# Patient Record
Sex: MALE | Race: WHITE | Employment: OTHER | URBAN - METROPOLITAN AREA
[De-identification: names, ages, dates, MRNs, and addresses within clinical notes are randomized per-mention and may not be internally consistent; named-entity substitution may affect disease eponyms.]

---

## 2017-01-18 PROBLEM — S72.002D CLOSED LEFT HIP FRACTURE, WITH ROUTINE HEALING, SUBSEQUENT ENCOUNTER: Status: ACTIVE | Noted: 2017-01-18

## 2017-09-21 PROBLEM — E78.2 MIXED HYPERLIPIDEMIA: Status: ACTIVE | Noted: 2017-09-21

## 2017-09-21 PROBLEM — I10 ESSENTIAL HYPERTENSION: Status: ACTIVE | Noted: 2017-09-21

## 2017-09-21 PROBLEM — Z95.810 ICD (IMPLANTABLE CARDIOVERTER-DEFIBRILLATOR) IN PLACE: Status: ACTIVE | Noted: 2017-09-21

## 2017-12-15 PROBLEM — I50.22 CHRONIC SYSTOLIC CONGESTIVE HEART FAILURE (HCC): Status: ACTIVE | Noted: 2017-12-15

## 2018-12-14 PROBLEM — I10 HTN (HYPERTENSION), BENIGN: Status: ACTIVE | Noted: 2018-12-14

## 2019-03-12 PROBLEM — I50.22 CHF (CONGESTIVE HEART FAILURE), NYHA CLASS I, CHRONIC, SYSTOLIC (HCC): Status: ACTIVE | Noted: 2017-12-15

## 2019-12-10 PROBLEM — I70.0 ATHEROSCLEROSIS OF AORTA (HCC): Status: ACTIVE | Noted: 2019-12-10

## 2023-01-17 PROBLEM — M54.50 CHRONIC LOW BACK PAIN: Status: ACTIVE | Noted: 2021-08-03

## 2023-01-17 PROBLEM — E87.1 HYPONATREMIA: Status: ACTIVE | Noted: 2020-08-28

## 2023-01-17 PROBLEM — I48.91 ATRIAL FIBRILLATION (HCC): Status: ACTIVE | Noted: 2023-01-17

## 2023-01-17 PROBLEM — R26.89 MULTIFACTORIAL GAIT DISORDER: Status: ACTIVE | Noted: 2020-11-02

## 2023-01-17 PROBLEM — R20.2 PARESTHESIA OF BOTH HANDS: Status: ACTIVE | Noted: 2019-05-15

## 2023-01-17 PROBLEM — R20.0 NUMBNESS: Status: ACTIVE | Noted: 2021-01-28

## 2023-01-17 PROBLEM — J44.9 CHRONIC OBSTRUCTIVE PULMONARY DISEASE (HCC): Status: ACTIVE | Noted: 2017-05-12

## 2023-01-17 PROBLEM — G89.29 CHRONIC LOW BACK PAIN: Status: ACTIVE | Noted: 2021-08-03

## 2023-01-18 ENCOUNTER — OFFICE VISIT (OUTPATIENT)
Dept: SURGERY | Facility: CLINIC | Age: 78
End: 2023-01-18
Payer: MEDICARE

## 2023-01-18 VITALS
WEIGHT: 137.81 LBS | OXYGEN SATURATION: 100 % | SYSTOLIC BLOOD PRESSURE: 89 MMHG | BODY MASS INDEX: 20 KG/M2 | TEMPERATURE: 98 F | HEART RATE: 82 BPM | RESPIRATION RATE: 16 BRPM | DIASTOLIC BLOOD PRESSURE: 58 MMHG

## 2023-01-18 DIAGNOSIS — S01.00XD OPEN WOUND OF SCALP, UNSPECIFIED OPEN WOUND TYPE, SUBSEQUENT ENCOUNTER: Primary | ICD-10-CM

## 2023-01-18 DIAGNOSIS — C80.1 MALIGNANT SPINDLE CELL NEOPLASM (HCC): Primary | ICD-10-CM

## 2023-01-18 DIAGNOSIS — C80.1 MALIGNANT SPINDLE CELL NEOPLASM (HCC): ICD-10-CM

## 2023-01-18 DIAGNOSIS — S01.00XA OPEN WOUND OF SCALP, UNSPECIFIED OPEN WOUND TYPE, INITIAL ENCOUNTER: ICD-10-CM

## 2023-01-18 NOTE — PATIENT INSTRUCTIONS
Surgeon:              Dr. Yuko Serrano. Antonette Montanez, PhD                                          Tel:         [phone]                                  Fax:        [phone]    Surgery/Procedure: Scalp reconstruction with possible integra, possible skin graft, possible local tissue rearrangement, 2 hours for Jaz's portion, joint with Salti, general anesthesia, outpatient  AND  Scalp reconstruction with skin graft  1.5 hours, general anesthesia, outpatient, 3 weeks after first procedure                                      Hospital:  [facility]: [address] [place] [address], [place], [address]           [phone]  [facility]: [address], [facility]               [phone]    1. Someone will need to drive you to and from the hospital if your procedure is outpatient. 2.Do not drink alcohol or smoke 24 hours prior to your procedure. 3. Bring a picture ID and your insurance card. 4. You will be contacted by the hospital the day before to confirm the procedure time and location. 5. The hospital will also contact you approximately one week before surgery to schedule your COVID test.     6. Do not take any herbal supplements or blood thinners at least one week before your procedure/surgery. This includes NSAID's (aspirin, baby aspirin, Motrin, Ibuprofen, Aleve, Advil, Naproxen, etc), Plavix, fish oil, vitamin E, turmeric, CoQ10, or green tea supplements, etc. *TYLENOL or acetaminophen is ok to take*    7. PRE-OPERATIVE TESTING: History and physical with medical clearance is REQUIRED within 30 days of the surgery date and is mandatory per Dr. Antonette Montanez. *If this is not done, your surgery will be postponed*  [facility] [facility]   CBC  CMP  EKG  Cardiac clearance with perioperative instructions regarding anticoagulation     8. Please inform us if you develop any Covid-19 like symptoms, test positive or have been exposed for Covid- 19 prior to surgery.      Consent obtained 01/18/2023  Photos taken on 01/18/2023

## 2023-01-20 ENCOUNTER — LAB ENCOUNTER (OUTPATIENT)
Dept: LAB | Facility: HOSPITAL | Age: 78
End: 2023-01-20
Attending: SURGERY
Payer: MEDICARE

## 2023-01-20 DIAGNOSIS — C80.1 MALIGNANT SPINDLE CELL NEOPLASM (HCC): Primary | ICD-10-CM

## 2023-01-20 DIAGNOSIS — C44.42 SQUAMOUS CELL CARCINOMA, SCALP/NECK: Primary | ICD-10-CM

## 2023-01-20 DIAGNOSIS — C80.1 MALIGNANT SPINDLE CELL NEOPLASM (HCC): ICD-10-CM

## 2023-01-20 PROCEDURE — 88321 CONSLTJ&REPRT SLD PREP ELSWR: CPT

## 2023-01-23 ENCOUNTER — APPOINTMENT (OUTPATIENT)
Dept: LAB | Facility: HOSPITAL | Age: 78
End: 2023-01-23
Attending: PATHOLOGY
Payer: MEDICARE

## 2023-01-23 PROCEDURE — 88321 CONSLTJ&REPRT SLD PREP ELSWR: CPT

## 2023-01-30 ENCOUNTER — TELEPHONE (OUTPATIENT)
Dept: SURGERY | Facility: CLINIC | Age: 78
End: 2023-01-30

## 2023-01-30 NOTE — TELEPHONE ENCOUNTER
Calling to discuss surgery scheduling. Spoke with Yan Queen, the patient's daughter, who said they haven't yet discussed whether or not they wish to proceed with the surgery option. Asked that someone from Dr. Blanca North team reaches out to discuss. I assured her I would pass this information on and that someone would reach out accordingly to discuss.

## 2023-01-31 ENCOUNTER — TELEPHONE (OUTPATIENT)
Dept: SURGERY | Facility: CLINIC | Age: 78
End: 2023-01-31

## 2023-01-31 NOTE — TELEPHONE ENCOUNTER
Spoke with patient's daughter, Orlando Bermudez, to give her and patient an update regarding surgery. Informed Orlando Bermudez that it not recommended to do any surgical reconstruction before knowing that margins are clear from any atypical or cancer cells. Orlando Bermudez stated the patient would like this to be done under local anesthesia. Informed her we will reach out after discussing this with the surgeons. Orlando Bermudez was appreciative of the call.

## 2023-02-03 ENCOUNTER — TELEPHONE (OUTPATIENT)
Dept: SURGERY | Facility: CLINIC | Age: 78
End: 2023-02-03

## 2023-02-03 NOTE — TELEPHONE ENCOUNTER
Called and spoke with daughter and let her know that Dr. Дмитрий Llamas spoke with Dr. Hailee Rosa regarding the margins. Let her know that he would need more of an excision. Daughter stated understanding and she stated her father did not want to do general anaesthesia. Stated I would discuss with the surgeons.

## 2023-02-06 ENCOUNTER — TELEPHONE (OUTPATIENT)
Dept: SURGERY | Facility: CLINIC | Age: 78
End: 2023-02-06

## 2023-02-07 ENCOUNTER — TELEPHONE (OUTPATIENT)
Dept: SURGERY | Facility: CLINIC | Age: 78
End: 2023-02-07

## 2023-02-07 DIAGNOSIS — C80.1 MALIGNANT SPINDLE CELL NEOPLASM (HCC): Primary | ICD-10-CM

## 2023-02-07 NOTE — TELEPHONE ENCOUNTER
Calling patient to confirm 03/21/2023 at the [facility] with Dr. Zoey John and Dr. Stephani Engle works with the patient. Yan Queen, the patient's daughter, confirmed and agreed.

## 2023-03-10 ENCOUNTER — ANESTHESIA EVENT (OUTPATIENT)
Dept: SURGERY | Facility: HOSPITAL | Age: 78
End: 2023-03-10
Payer: MEDICARE

## 2023-03-18 ENCOUNTER — LAB ENCOUNTER (OUTPATIENT)
Dept: LAB | Age: 78
End: 2023-03-18
Attending: SURGERY
Payer: MEDICARE

## 2023-03-18 DIAGNOSIS — Z01.818 PRE-OP TESTING: ICD-10-CM

## 2023-03-19 LAB — SARS-COV-2 RNA RESP QL NAA+PROBE: NOT DETECTED

## 2023-03-21 ENCOUNTER — HOSPITAL ENCOUNTER (OUTPATIENT)
Facility: HOSPITAL | Age: 78
Setting detail: HOSPITAL OUTPATIENT SURGERY
Discharge: HOME | End: 2023-03-21
Attending: SURGERY | Admitting: SURGERY
Payer: MEDICARE

## 2023-03-21 ENCOUNTER — ANESTHESIA (OUTPATIENT)
Dept: SURGERY | Facility: HOSPITAL | Age: 78
End: 2023-03-21
Payer: MEDICARE

## 2023-03-21 VITALS
DIASTOLIC BLOOD PRESSURE: 70 MMHG | WEIGHT: 143.19 LBS | OXYGEN SATURATION: 98 % | HEIGHT: 68 IN | HEART RATE: 71 BPM | BODY MASS INDEX: 21.7 KG/M2 | SYSTOLIC BLOOD PRESSURE: 105 MMHG | RESPIRATION RATE: 20 BRPM | TEMPERATURE: 99 F

## 2023-03-21 DIAGNOSIS — C80.1 MALIGNANT SPINDLE CELL NEOPLASM (HCC): ICD-10-CM

## 2023-03-21 DIAGNOSIS — Z01.818 PRE-OP TESTING: Primary | ICD-10-CM

## 2023-03-21 LAB
ANTIBODY SCREEN: NEGATIVE
ATRIAL RATE: 60 BPM
P AXIS: 72 DEGREES
P-R INTERVAL: 182 MS
Q-T INTERVAL: 482 MS
QRS DURATION: 126 MS
QTC CALCULATION (BEZET): 482 MS
R AXIS: 24 DEGREES
RH BLOOD TYPE: POSITIVE
T AXIS: 100 DEGREES
VENTRICULAR RATE: 60 BPM

## 2023-03-21 PROCEDURE — 0JB00ZZ EXCISION OF SCALP SUBCUTANEOUS TISSUE AND FASCIA, OPEN APPROACH: ICD-10-PCS | Performed by: SURGERY

## 2023-03-21 PROCEDURE — 0HR0XK3 REPLACEMENT OF SCALP SKIN WITH NONAUTOLOGOUS TISSUE SUBSTITUTE, FULL THICKNESS, EXTERNAL APPROACH: ICD-10-PCS | Performed by: SURGERY

## 2023-03-21 PROCEDURE — 88331 PATH CONSLTJ SURG 1 BLK 1SPC: CPT | Performed by: SURGERY

## 2023-03-21 PROCEDURE — 0JQ00ZZ REPAIR SCALP SUBCUTANEOUS TISSUE AND FASCIA, OPEN APPROACH: ICD-10-PCS | Performed by: SURGERY

## 2023-03-21 PROCEDURE — 86900 BLOOD TYPING SEROLOGIC ABO: CPT | Performed by: SURGERY

## 2023-03-21 PROCEDURE — 0HB4XZZ EXCISION OF NECK SKIN, EXTERNAL APPROACH: ICD-10-PCS | Performed by: SURGERY

## 2023-03-21 PROCEDURE — 88304 TISSUE EXAM BY PATHOLOGIST: CPT | Performed by: SURGERY

## 2023-03-21 PROCEDURE — 93010 ELECTROCARDIOGRAM REPORT: CPT | Performed by: INTERNAL MEDICINE

## 2023-03-21 PROCEDURE — 86850 RBC ANTIBODY SCREEN: CPT | Performed by: SURGERY

## 2023-03-21 PROCEDURE — 0HR3X73 REPLACEMENT OF LEFT EAR SKIN WITH AUTOLOGOUS TISSUE SUBSTITUTE, FULL THICKNESS, EXTERNAL APPROACH: ICD-10-PCS | Performed by: SURGERY

## 2023-03-21 PROCEDURE — 88311 DECALCIFY TISSUE: CPT | Performed by: SURGERY

## 2023-03-21 PROCEDURE — 88305 TISSUE EXAM BY PATHOLOGIST: CPT | Performed by: SURGERY

## 2023-03-21 PROCEDURE — 93005 ELECTROCARDIOGRAM TRACING: CPT

## 2023-03-21 PROCEDURE — 86901 BLOOD TYPING SEROLOGIC RH(D): CPT | Performed by: SURGERY

## 2023-03-21 PROCEDURE — 0HB3XZZ EXCISION OF LEFT EAR SKIN, EXTERNAL APPROACH: ICD-10-PCS | Performed by: SURGERY

## 2023-03-21 DEVICE — INTEGRA® BILAYER MATRIX WOUND DRESSING 2 IN*2 IN (5 CM*5 CM)
Type: IMPLANTABLE DEVICE | Site: SCALP | Status: FUNCTIONAL
Brand: INTEGRA®

## 2023-03-21 RX ORDER — AMIODARONE HYDROCHLORIDE 200 MG/1
200 TABLET ORAL DAILY
COMMUNITY
Start: 2023-02-02

## 2023-03-21 RX ORDER — SODIUM CHLORIDE, SODIUM LACTATE, POTASSIUM CHLORIDE, CALCIUM CHLORIDE 600; 310; 30; 20 MG/100ML; MG/100ML; MG/100ML; MG/100ML
INJECTION, SOLUTION INTRAVENOUS CONTINUOUS
Status: DISCONTINUED | OUTPATIENT
Start: 2023-03-21 | End: 2023-03-21

## 2023-03-21 RX ORDER — MIDODRINE HYDROCHLORIDE 10 MG/1
10 TABLET ORAL ONCE
Status: COMPLETED | OUTPATIENT
Start: 2023-03-21 | End: 2023-03-21

## 2023-03-21 RX ORDER — TRAMADOL HYDROCHLORIDE 50 MG/1
50 TABLET ORAL EVERY 6 HOURS PRN
Qty: 10 TABLET | Refills: 0 | Status: SHIPPED | OUTPATIENT
Start: 2023-03-21

## 2023-03-21 RX ORDER — DOBUTAMINE HYDROCHLORIDE 200 MG/100ML
INJECTION INTRAVENOUS CONTINUOUS PRN
Status: DISCONTINUED | OUTPATIENT
Start: 2023-03-21 | End: 2023-03-21 | Stop reason: SURG

## 2023-03-21 RX ORDER — ONDANSETRON 2 MG/ML
4 INJECTION INTRAMUSCULAR; INTRAVENOUS EVERY 6 HOURS PRN
Status: DISCONTINUED | OUTPATIENT
Start: 2023-03-21 | End: 2023-03-21

## 2023-03-21 RX ORDER — ROCURONIUM BROMIDE 10 MG/ML
INJECTION, SOLUTION INTRAVENOUS AS NEEDED
Status: DISCONTINUED | OUTPATIENT
Start: 2023-03-21 | End: 2023-03-21 | Stop reason: SURG

## 2023-03-21 RX ORDER — LIDOCAINE HYDROCHLORIDE AND EPINEPHRINE 10; 10 MG/ML; UG/ML
INJECTION, SOLUTION INFILTRATION; PERINEURAL AS NEEDED
Status: DISCONTINUED | OUTPATIENT
Start: 2023-03-21 | End: 2023-03-21 | Stop reason: HOSPADM

## 2023-03-21 RX ORDER — PHENYLEPHRINE HCL 10 MG/ML
VIAL (ML) INJECTION AS NEEDED
Status: DISCONTINUED | OUTPATIENT
Start: 2023-03-21 | End: 2023-03-21 | Stop reason: SURG

## 2023-03-21 RX ORDER — ACETAMINOPHEN 500 MG
1000 TABLET ORAL ONCE
Status: DISCONTINUED | OUTPATIENT
Start: 2023-03-21 | End: 2023-03-21 | Stop reason: HOSPADM

## 2023-03-21 RX ORDER — LIDOCAINE HYDROCHLORIDE 10 MG/ML
INJECTION, SOLUTION EPIDURAL; INFILTRATION; INTRACAUDAL; PERINEURAL AS NEEDED
Status: DISCONTINUED | OUTPATIENT
Start: 2023-03-21 | End: 2023-03-21 | Stop reason: SURG

## 2023-03-21 RX ORDER — EPHEDRINE SULFATE 50 MG/ML
INJECTION INTRAVENOUS AS NEEDED
Status: DISCONTINUED | OUTPATIENT
Start: 2023-03-21 | End: 2023-03-21 | Stop reason: SURG

## 2023-03-21 RX ORDER — CEFAZOLIN SODIUM/WATER 2 G/20 ML
SYRINGE (ML) INTRAVENOUS
Status: DISCONTINUED
Start: 2023-03-21 | End: 2023-03-21

## 2023-03-21 RX ORDER — CEFAZOLIN SODIUM/WATER 2 G/20 ML
2 SYRINGE (ML) INTRAVENOUS ONCE
Status: COMPLETED | OUTPATIENT
Start: 2023-03-21 | End: 2023-03-21

## 2023-03-21 RX ORDER — ACETAMINOPHEN 500 MG
1000 TABLET ORAL ONCE AS NEEDED
Status: DISCONTINUED | OUTPATIENT
Start: 2023-03-21 | End: 2023-03-21

## 2023-03-21 RX ORDER — NALOXONE HYDROCHLORIDE 0.4 MG/ML
80 INJECTION, SOLUTION INTRAMUSCULAR; INTRAVENOUS; SUBCUTANEOUS AS NEEDED
Status: DISCONTINUED | OUTPATIENT
Start: 2023-03-21 | End: 2023-03-21

## 2023-03-21 RX ADMIN — EPHEDRINE SULFATE 10 MG: 50 INJECTION INTRAVENOUS at 09:15:00

## 2023-03-21 RX ADMIN — DOBUTAMINE HYDROCHLORIDE 4 MCG/KG/MIN: 200 INJECTION INTRAVENOUS at 10:10:00

## 2023-03-21 RX ADMIN — CEFAZOLIN SODIUM/WATER 2 G: 2 G/20 ML SYRINGE (ML) INTRAVENOUS at 08:06:00

## 2023-03-21 RX ADMIN — SODIUM CHLORIDE, SODIUM LACTATE, POTASSIUM CHLORIDE, CALCIUM CHLORIDE: 600; 310; 30; 20 INJECTION, SOLUTION INTRAVENOUS at 07:34:00

## 2023-03-21 RX ADMIN — ROCURONIUM BROMIDE 20 MG: 10 INJECTION, SOLUTION INTRAVENOUS at 08:12:00

## 2023-03-21 RX ADMIN — SODIUM CHLORIDE, SODIUM LACTATE, POTASSIUM CHLORIDE, CALCIUM CHLORIDE: 600; 310; 30; 20 INJECTION, SOLUTION INTRAVENOUS at 10:48:00

## 2023-03-21 RX ADMIN — ROCURONIUM BROMIDE 10 MG: 10 INJECTION, SOLUTION INTRAVENOUS at 09:43:00

## 2023-03-21 RX ADMIN — DOBUTAMINE HYDROCHLORIDE 15 MCG/KG/MIN: 200 INJECTION INTRAVENOUS at 09:15:00

## 2023-03-21 RX ADMIN — DOBUTAMINE HYDROCHLORIDE 8 MCG/KG/MIN: 200 INJECTION INTRAVENOUS at 09:51:00

## 2023-03-21 RX ADMIN — DOBUTAMINE HYDROCHLORIDE 3 MCG/KG/MIN: 200 INJECTION INTRAVENOUS at 07:52:00

## 2023-03-21 RX ADMIN — LIDOCAINE HYDROCHLORIDE 20 MG: 10 INJECTION, SOLUTION EPIDURAL; INFILTRATION; INTRACAUDAL; PERINEURAL at 07:45:00

## 2023-03-21 RX ADMIN — PHENYLEPHRINE HCL 200 MCG: 10 MG/ML VIAL (ML) INJECTION at 09:13:00

## 2023-03-21 RX ADMIN — DOBUTAMINE HYDROCHLORIDE 5 MCG/KG/MIN: 200 INJECTION INTRAVENOUS at 08:03:00

## 2023-03-21 RX ADMIN — DOBUTAMINE HYDROCHLORIDE 7 MCG/KG/MIN: 200 INJECTION INTRAVENOUS at 08:12:00

## 2023-03-21 RX ADMIN — PHENYLEPHRINE HCL 100 MCG: 10 MG/ML VIAL (ML) INJECTION at 09:08:00

## 2023-03-21 RX ADMIN — EPHEDRINE SULFATE 10 MG: 50 INJECTION INTRAVENOUS at 07:55:00

## 2023-03-21 RX ADMIN — PHENYLEPHRINE HCL 300 MCG: 10 MG/ML VIAL (ML) INJECTION at 09:26:00

## 2023-03-21 RX ADMIN — ROCURONIUM BROMIDE 35 MG: 10 INJECTION, SOLUTION INTRAVENOUS at 07:45:00

## 2023-03-21 RX ADMIN — DOBUTAMINE HYDROCHLORIDE 12 MCG/KG/MIN: 200 INJECTION INTRAVENOUS at 09:05:00

## 2023-03-21 RX ADMIN — DOBUTAMINE HYDROCHLORIDE 10 MCG/KG/MIN: 200 INJECTION INTRAVENOUS at 09:43:00

## 2023-03-21 RX ADMIN — EPHEDRINE SULFATE 10 MG: 50 INJECTION INTRAVENOUS at 09:26:00

## 2023-03-21 NOTE — DISCHARGE INSTRUCTIONS
Plastic Surgery Home Care Instructions      We hope you were pleased with your care at [facility].  We wish you the best outcome and overall experience with your operation. These instructions will help to minimize pain, optimize healing, and improve the likelihood of a successful result. What To Expect  There may be some spotting of the incision lines for the next few days. Mild bruising, mild swelling and discomfort in the surgical area is typical.   Temporary areas of numbness are typical in the early weeks following your procedure. Normalization of sensation can typically take up to several months following the operation. Bandages (Dressing)  Wear head dressing for at least two days. You can leave it on until your post-operative visit or remove the outer dressing after two days. Apply extra gauze as needed if you have any oozing through the surgical dressing. Leave yellow gauze in place. Apply antibiotic ointment (Neosporin, bacitracin etc) daily around yellow gauze and along the incision on your neck if you remove the head dressing    Bathing/Showers  DO NOT get surgical areas wet  No baths, swimming, or hot tubs until you receive medical permission    Pain Medication: Tramadol  Take one tablet every six hours as needed for pain. Do not take narcotics, if you do not have pain. Keep stools soft. Take a stool softener (Metamucil, Milk of Magnesia, Colace). Eating fruit will also help to prevent constipation    Over-The-Counter Medication  You can take Tylenol after surgery for pain relief as needed  Take as directed on the bottle    Home Medication  Resume your home medications as instructed,  Do not resume herbal medications for two weeks    Diet  Resume your normal diet    Activity  No strenuous activity   You cannot return to physical exercise, sports, or gym workouts until you are allowed to participate in strenuous activity.     Driving  Do not drive, if you are taking pain medication. Return to Work or [place] can return to work when you are not taking pain medication, if your work does not involve strenuous activity. Contact the office, if you need a medical note. Follow-up Appointment with Ashlee Weldon PA-C on 3/27/2023 and with Dr. Akanksha Yang on 4/5/2023  Our office will call you to set up a time    Questions or Concerns  Call Dr. Shirin Cordero office if you experience severe pain not controlled by pain medication, swelling, numbness, tingling, bleeding, fever, or other concerns. If she is not available, another physician will be able to address your concerns. Noam Villalobos   Thank you for coming to [facility] for your operation. The nurses and the anesthesiologist try very hard to make sure you receive the best care possible. Your trust in them is greatly appreciated.     Thanks so much,  Dr. Olya Esquivel PA-C

## 2023-03-21 NOTE — BRIEF OP NOTE
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     Surgeon(s) and Role:     * Antonio Larry MD - Primary    Assistant(s):  PA: ROSARIO Grimaldo     Surgical Findings: as expected, margins negative per frozen     Specimen: yes     Estimated Blood Loss: 10 ml Bel Villavicencio  3/21/2023  9:51 AM

## 2023-03-21 NOTE — ANESTHESIA PROCEDURE NOTES
Airway  Date/Time: 3/21/2023 7:48 AM  Urgency: elective      General Information and Staff    Patient location during procedure: OR  Anesthesiologist: Max Garcia MD  Resident/CRNA: Abdirahman Dotson CRNA  Performed: CRNA   Performed by: Abdirahman Dotson CRNA  Authorized by: Max Garcia MD      Indications and Patient Condition  Indications for airway management: anesthesia  Sedation level: deep  Preoxygenated: yes  Patient position: sniffing  Mask difficulty assessment: 1 - vent by mask    Final Airway Details  Final airway type: endotracheal airway      Successful airway: ETT  Cuffed: yes   Successful intubation technique: direct laryngoscopy  Endotracheal tube insertion site: oral  Blade: Pancho  Blade size: #4  ETT size (mm): 7.0    Cormack-Lehane Classification: grade IIB - view of arytenoids or posterior of glottis only  Placement verified by: chest auscultation and capnometry   Measured from: lips  ETT to lips (cm): 21  Number of attempts at approach: 1

## 2023-03-21 NOTE — BRIEF OP NOTE
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

## 2023-03-21 NOTE — ANESTHESIA POSTPROCEDURE EVALUATION
[facility] [place]el [place] Patient Status:  Hospital Outpatient Surgery   Age/Gender 76year old male MRN [id number]   Location [address] [facility] Attending Karely Eric MD   [facility] Day # 0 PCP Martell Roberson DO       Anesthesia Post-op Note    Radical resection spindle cell neoplasm scalp; Excision Left ear skin lesion (SALTI). Scalp reconstruction with possible integra, possible skin graft, possible local tissue rearrangement (MONTY)    Procedure Summary     Date: 03/21/23 Room / Location: [facility] MAIN OR [address] [place] [address] MAIN OR    Anesthesia Start: 0730 Anesthesia Stop: 6733    Procedures:       Radical resection spindle cell neoplasm scalp; Excision Left ear skin lesion (SALTI). Scalp reconstruction with possible integra, possible skin graft, possible local tissue rearrangement (MONTY)      . Diagnosis:       Malignant spindle cell neoplasm (HCC)      (Malignant spindle cell neoplasm ([facility] [address] [C80.1])    Surgeons: Karely Eric MD; Boris Oliveros MD Anesthesiologist: Juan Mayorga MD    Anesthesia Type: general ASA Status: 3          Anesthesia Type: No value filed. Vitals Value Taken Time   BP 95/67 03/21/23 1056   Temp 97.7 03/21/23 1056   Pulse 97 03/21/23 1056   Resp 20 03/21/23 1056   SpO2 97% 03/21/23 1056       Patient Location: PACU    Anesthesia Type: general    Airway Patency: patent    Postop Pain Control: adequate    Mental Status: mildly sedated but able to meaningfully participate in the post-anesthesia evaluation    Nausea/Vomiting: none    Cardiopulmonary/Hydration status: stable euvolemic    Complications: no apparent anesthesia related complications    Postop vital signs: stable    Dental Exam: Unchanged from Preop    Patient to be discharged from PACU when criteria met.

## 2023-03-22 ENCOUNTER — TELEPHONE (OUTPATIENT)
Dept: SURGERY | Facility: CLINIC | Age: 78
End: 2023-03-22

## 2023-03-22 NOTE — OPERATIVE REPORT
[facility]    PATIENT'S NAME: Vanessa Carolina   ATTENDING PHYSICIAN: Johanna Reynolds. Junito Garza MD   OPERATING PHYSICIAN: Johanna Reynolds. Junito Garza MD   PATIENT ACCOUNT#:   [id number]    LOCATION:  [address]  MEDICAL RECORD #:   [id number]       YOB: 1945  ADMISSION DATE:       03/21/2023      OPERATION DATE:  03/21/2023    OPERATIVE REPORT      PREOPERATIVE DIAGNOSIS:    1.   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 2.   Skin lesion, left ear. POSTOPERATIVE DIAGNOSIS:    1.   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 2.   Skin lesion, left ear. 3.   Pending final pathology report. PROCEDURE:    1.   Radical resection of malignant spindle cell neoplasm of the scalp to include underlying bone. 2.   Excision of skin lesion, left ear (1.5 cm). 3.   Reconstruction with Dr. Carl Bianchi, Plastic Surgery. ASSISTANT:  Florencia Martinez PA-C     ANESTHESIA:  General.    INDICATIONS:  The patient is a 58-year-old man diagnosed with malignant spindle cell neoplasm of the scalp with involved margins. He presents today for surgical management. 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 The surgical treatment matter had been discussed with the patient including indications and risks at length. OPERATIVE TECHNIQUE:  Patient was brought to the operating room and was placed in supine position. He was given general anesthesia by the anesthesiology service. Sequential compression boots were placed. The patient received preoperative intravenous antibiotic prophylaxis. He was prepped and draped in normal sterile fashion. At least 0.5 cm was marked around the lesion of the right scalp, and an incision, circular in nature, measuring 7.5 cm in diameter was made and deepened to include underlying periosteum exposing the bone.   Peripheral margins were taken as extra and sent for frozen section analysis, revealing no evidence for malignancy. The specimen was excised in toto, oriented, and sent to Pathology for permanent section evaluation. I further removed deep margin of the bone using bur and sent the bone shavings to Pathology for permanent section evaluation. Hemostasis was achieved and maintained, and the site was covered with saline-impregnated gauze. Next, a circular incision in the helix of the left ear was made and deepened with peripheral margin sent to Pathology, with deep margins revealing no evidence for malignancy. The skin lesion had been excised and sent to Pathology. At this point, Dr. Maria Mcgee presented for reconstruction, and her portion of the dictation should be noted elsewhere. Patient tolerated my portion of the procedure well without immediate complications. Dictated By Yvan Callahan MD  d: 03/21/2023 16:22:19  t: 03/21/2023 20:57:09  Samm Christina [id number]  ARCHANA/

## 2023-03-22 NOTE — TELEPHONE ENCOUNTER
Called to check on patient after procedure. Spoke with patient daughter and she stated his pain was not relieved with the tramadol. Let her know that he could also take tylenol and alternate. She is going to try that and let me know if it relieves the pain. Confirmed post op appointment.

## 2023-03-22 NOTE — OPERATIVE REPORT
[address]    PATIENT'S NAME: Zoran Kolb   ATTENDING PHYSICIAN: Gisselle Holt. Estephania Khan MD   OPERATING PHYSICIAN: Radha Yang MD   PATIENT ACCOUNT#:   [id number]    LOCATION:  [address] [place] [address]  MEDICAL RECORD #:   [id number]       YOB: 1945  ADMISSION DATE:       03/21/2023      OPERATION DATE:  03/21/2023    OPERATIVE REPORT      PREOPERATIVE DIAGNOSIS:  Spindle cell and squamous cell, scalp; suspicious skin lesion, left ear; open wound, scalp; open wound, left ear. POSTOPERATIVE DIAGNOSIS:  Spindle cell and squamous cell, scalp; suspicious skin lesion, left ear; open wound, scalp; open wound, left ear. PROCEDURE:    1.   Scalp reconstruction with partial complex closure, 2.5 cm; and Integra dermal substitute placement, 25 sq cm. 2.   Left ear reconstruction with partial complex closure, 1 cm; and full-thickness skin graft from left neck, 1 sq cm. ANESTHESIA:  General endotracheal anesthesia. COMPLICATIONS:  None. BLOOD LOSS:  Minimal.    INDICATIONS:  This is a 75-year-old gentleman with a suspicious skin lesion on his left ear and an open wound and spindle cell neoplasm on his scalp. He was seen by Dr. Estephania Khan for surgical excision, and I was consulted for reconstruction. He was seen in the office and discussed reconstructive options including staging of procedure as well as skin graft and partial closure and local tissue rearrangement. Potential risks, complications, benefits and alternatives were discussed. Risks and complications include, but are not limited to, infection, bleeding, scarring, damage to surrounding structures, hematoma, seroma, graft failure, flap failure, need for further surgery. The patient understands and wishes to proceed. Questions were answered. OPERATIVE TECHNIQUE:  Patient was identified in the preoperative area by Dr. Estephania Khan and brought to the operating room.   I was called into the room after Dr. Estephania Khan had completed his portion of the case. At that time, there was an 8 x 6 cm open wound with bleeding calvarial bone on the scalp. There was also an open wound of 1.5 cm on his left helical rim, posterior ear. The frozen sections per the pathologist were all negative except the deep margin on the ear lesion had positive cells, but the extra-deep margin was negative, specifically on the extra-deep surface. Therefore, the reconstruction was started. Proximal complex closure was performed on the scalp to decrease the scalp defect to 6 x 5 cm. This was done with a partial-layer closure with 3-0 interrupted Vicryl sutures and 4-0 chromic sutures, 2.5 cm area. Then, Integra was placed and secured with 4-0 chromic sutures circumferentially. A Xeroform bolster was applied. Then, the ear reconstruction was performed by partial complex closure with 5-0 Vicryl sutures to decrease the defect size, and then a full-thickness skin graft from the left neck was harvested and then placed onto the ear and secured with 5-0 chromic suture circumferentially and centrally. A Xeroform bolster was applied and secured. The donor site was closed with 5-0 interrupted Vicryl sutures and 5-0 Prolene sutures. A head dressing was applied. The patient tolerated the procedure well and awoke from anesthesia without difficulty. All sponge, instrument, and needle counts were correct at the end of the case. Dictated By Hannah Goldmann.  Pedro Luis Dover MD  d: 03/21/2023 17:17:51  t: 03/21/2023 21:35:48  Job [id number]  QOQ/

## 2023-03-27 ENCOUNTER — OFFICE VISIT (OUTPATIENT)
Dept: SURGERY | Facility: CLINIC | Age: 78
End: 2023-03-27
Payer: MEDICARE

## 2023-03-27 DIAGNOSIS — D04.22 SQUAMOUS CELL CARCINOMA IN SITU (SCCIS) OF SKIN OF HELIX OF LEFT EAR: ICD-10-CM

## 2023-03-27 DIAGNOSIS — C80.1 MALIGNANT SPINDLE CELL NEOPLASM (HCC): Primary | ICD-10-CM

## 2023-03-27 PROCEDURE — 99024 POSTOP FOLLOW-UP VISIT: CPT | Performed by: PHYSICIAN ASSISTANT

## 2023-03-27 NOTE — PROGRESS NOTES
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 Guanakito Livingston) and scalp reconstruction with partial complex closure and Integra placement, left ear reconstruction with partial complex closure and full-thickness skin graft from left neck (Dr. Santos Pimentel) on 3/21/2023. He denies fever and chills. He denies nausea, vomiting, diarrhea or constipation. His pain is controlled. Physical Exam     Surgical incisions are clean, dry, and intact. No erythema, no wound drainage. HEENT: Xeroform bolster was removed. Scalp Integra adherent. Left ear skin graft adherent. No wound drainage or surrounding erythema. Left neck incision clean, dry and intact with Prolene sutures in place. No wound drainage, wound dehiscence or erythema. There were no vitals filed for this visit. 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 Guanakito Livingston) and scalp reconstruction with partial complex closure and Integra placement, left ear reconstruction with partial complex closure and full-thickness skin graft from left neck (Dr. Santos Pimentel) on 3/21/2023. Xeroform bolster was removed today and Prolene sutures were removed. A dressing of Neosporin, Xeroform, Telfa, Kerlix head wrap were placed. He will change this daily. He should continue to not get the surgical sites wet. He will follow-up in 1 week for recheck and preoperative visit for his second stage reconstruction which is currently scheduled on 4/11/2023. He was instructed to call with any questions or concerns in the meantime. Questions were answered. Patient understands.      Bel Patel  3/27/2023  12:19 PM

## 2023-03-31 ENCOUNTER — ANESTHESIA EVENT (OUTPATIENT)
Dept: SURGERY | Facility: HOSPITAL | Age: 78
End: 2023-03-31
Payer: MEDICARE

## 2023-04-03 ENCOUNTER — TELEPHONE (OUTPATIENT)
Dept: SURGERY | Facility: CLINIC | Age: 78
End: 2023-04-03

## 2023-04-03 NOTE — TELEPHONE ENCOUNTER
Spoke with patient's daughter, Jessee Da Silva. Jessee Rekha stated the patient has hives throughout his body and is very itchy. She denies that patient has any shortness of breath or trouble breathing. Jessee Da Silva will reach out to patient's PCP for recommendations and further treatment. Informed Dr. Akanksha Yang of patient issue and per Dr. Akanksha Yang, it is okay for patient to start on a steroid if needed, to be ordered by PCP. Jessee Da Silva will let our office know if patient does start steroid medication on Wednesday at patient's visit.

## 2023-04-05 ENCOUNTER — OFFICE VISIT (OUTPATIENT)
Dept: SURGERY | Facility: CLINIC | Age: 78
End: 2023-04-05
Payer: MEDICARE

## 2023-04-05 DIAGNOSIS — C80.1 MALIGNANT SPINDLE CELL NEOPLASM (HCC): Primary | ICD-10-CM

## 2023-04-05 PROCEDURE — 99024 POSTOP FOLLOW-UP VISIT: CPT | Performed by: PHYSICIAN ASSISTANT

## 2023-04-05 PROCEDURE — 99024 POSTOP FOLLOW-UP VISIT: CPT | Performed by: SURGERY

## 2023-04-06 ENCOUNTER — TELEPHONE (OUTPATIENT)
Dept: SURGERY | Facility: CLINIC | Age: 78
End: 2023-04-06

## 2023-04-06 DIAGNOSIS — C80.1 MALIGNANT SPINDLE CELL NEOPLASM (HCC): Primary | ICD-10-CM

## 2023-04-06 NOTE — TELEPHONE ENCOUNTER
Calling pt daughter in regards to scheduling surgery. Informed pt that I have 04/25/2023 available at [facility] with Dr. Akanksha Yang. Pt verbalized understanding and in agreement with date and location. All questions answered. Encouraged pt to call or [facility] message office with any other questions or concerns.

## 2023-04-11 ENCOUNTER — ANESTHESIA (OUTPATIENT)
Dept: SURGERY | Facility: HOSPITAL | Age: 78
End: 2023-04-11
Payer: MEDICARE

## 2023-04-24 ENCOUNTER — TELEPHONE (OUTPATIENT)
Dept: SURGERY | Facility: CLINIC | Age: 78
End: 2023-04-24

## 2023-04-24 NOTE — TELEPHONE ENCOUNTER
Returning daughter's call wanting to know what time is his surgery, informed her admissions will call them today to let her know.

## 2023-04-25 ENCOUNTER — HOSPITAL ENCOUNTER (OUTPATIENT)
Facility: HOSPITAL | Age: 78
Setting detail: HOSPITAL OUTPATIENT SURGERY
Discharge: HOME | End: 2023-04-25
Attending: SURGERY | Admitting: SURGERY
Payer: MEDICARE

## 2023-04-25 VITALS
WEIGHT: 139 LBS | OXYGEN SATURATION: 96 % | HEIGHT: 68 IN | HEART RATE: 72 BPM | TEMPERATURE: 98 F | SYSTOLIC BLOOD PRESSURE: 95 MMHG | DIASTOLIC BLOOD PRESSURE: 61 MMHG | BODY MASS INDEX: 21.07 KG/M2 | RESPIRATION RATE: 20 BRPM

## 2023-04-25 DIAGNOSIS — Z01.818 PRE-OP TESTING: Primary | ICD-10-CM

## 2023-04-25 PROCEDURE — 0HR0X73 REPLACEMENT OF SCALP SKIN WITH AUTOLOGOUS TISSUE SUBSTITUTE, FULL THICKNESS, EXTERNAL APPROACH: ICD-10-PCS | Performed by: SURGERY

## 2023-04-25 RX ORDER — LIDOCAINE HYDROCHLORIDE AND EPINEPHRINE 10; 10 MG/ML; UG/ML
INJECTION, SOLUTION INFILTRATION; PERINEURAL AS NEEDED
Status: DISCONTINUED | OUTPATIENT
Start: 2023-04-25 | End: 2023-04-25 | Stop reason: HOSPADM

## 2023-04-25 RX ORDER — HYDROCODONE BITARTRATE AND ACETAMINOPHEN 5; 325 MG/1; MG/1
2 TABLET ORAL ONCE AS NEEDED
Status: COMPLETED | OUTPATIENT
Start: 2023-04-25 | End: 2023-04-25

## 2023-04-25 RX ORDER — EPHEDRINE SULFATE 50 MG/ML
INJECTION INTRAVENOUS AS NEEDED
Status: DISCONTINUED | OUTPATIENT
Start: 2023-04-25 | End: 2023-04-25 | Stop reason: SURG

## 2023-04-25 RX ORDER — SODIUM CHLORIDE, SODIUM LACTATE, POTASSIUM CHLORIDE, CALCIUM CHLORIDE 600; 310; 30; 20 MG/100ML; MG/100ML; MG/100ML; MG/100ML
INJECTION, SOLUTION INTRAVENOUS CONTINUOUS
Status: DISCONTINUED | OUTPATIENT
Start: 2023-04-25 | End: 2023-04-25

## 2023-04-25 RX ORDER — HYDROCODONE BITARTRATE AND ACETAMINOPHEN 5; 325 MG/1; MG/1
1 TABLET ORAL ONCE AS NEEDED
Status: COMPLETED | OUTPATIENT
Start: 2023-04-25 | End: 2023-04-25

## 2023-04-25 RX ORDER — PHENYLEPHRINE HCL 10 MG/ML
VIAL (ML) INJECTION AS NEEDED
Status: DISCONTINUED | OUTPATIENT
Start: 2023-04-25 | End: 2023-04-25 | Stop reason: SURG

## 2023-04-25 RX ORDER — DEXAMETHASONE SODIUM PHOSPHATE 4 MG/ML
VIAL (ML) INJECTION AS NEEDED
Status: DISCONTINUED | OUTPATIENT
Start: 2023-04-25 | End: 2023-04-25 | Stop reason: SURG

## 2023-04-25 RX ORDER — ACETAMINOPHEN 500 MG
1000 TABLET ORAL ONCE
Status: DISCONTINUED | OUTPATIENT
Start: 2023-04-25 | End: 2023-04-25 | Stop reason: HOSPADM

## 2023-04-25 RX ORDER — LIDOCAINE HYDROCHLORIDE 10 MG/ML
INJECTION, SOLUTION EPIDURAL; INFILTRATION; INTRACAUDAL; PERINEURAL AS NEEDED
Status: DISCONTINUED | OUTPATIENT
Start: 2023-04-25 | End: 2023-04-25 | Stop reason: SURG

## 2023-04-25 RX ORDER — NALOXONE HYDROCHLORIDE 0.4 MG/ML
80 INJECTION, SOLUTION INTRAMUSCULAR; INTRAVENOUS; SUBCUTANEOUS AS NEEDED
Status: DISCONTINUED | OUTPATIENT
Start: 2023-04-25 | End: 2023-04-25

## 2023-04-25 RX ORDER — HYDROMORPHONE HYDROCHLORIDE 1 MG/ML
0.2 INJECTION, SOLUTION INTRAMUSCULAR; INTRAVENOUS; SUBCUTANEOUS EVERY 5 MIN PRN
Status: DISCONTINUED | OUTPATIENT
Start: 2023-04-25 | End: 2023-04-25

## 2023-04-25 RX ORDER — ONDANSETRON 2 MG/ML
4 INJECTION INTRAMUSCULAR; INTRAVENOUS EVERY 6 HOURS PRN
Status: DISCONTINUED | OUTPATIENT
Start: 2023-04-25 | End: 2023-04-25

## 2023-04-25 RX ORDER — CEFAZOLIN SODIUM/WATER 2 G/20 ML
2 SYRINGE (ML) INTRAVENOUS ONCE
Status: COMPLETED | OUTPATIENT
Start: 2023-04-25 | End: 2023-04-25

## 2023-04-25 RX ORDER — HYDROMORPHONE HYDROCHLORIDE 1 MG/ML
0.4 INJECTION, SOLUTION INTRAMUSCULAR; INTRAVENOUS; SUBCUTANEOUS EVERY 5 MIN PRN
Status: DISCONTINUED | OUTPATIENT
Start: 2023-04-25 | End: 2023-04-25

## 2023-04-25 RX ORDER — METOCLOPRAMIDE HYDROCHLORIDE 5 MG/ML
10 INJECTION INTRAMUSCULAR; INTRAVENOUS EVERY 8 HOURS PRN
Status: DISCONTINUED | OUTPATIENT
Start: 2023-04-25 | End: 2023-04-25

## 2023-04-25 RX ORDER — ACETAMINOPHEN 500 MG
1000 TABLET ORAL ONCE AS NEEDED
Status: COMPLETED | OUTPATIENT
Start: 2023-04-25 | End: 2023-04-25

## 2023-04-25 RX ORDER — HYDROMORPHONE HYDROCHLORIDE 1 MG/ML
0.6 INJECTION, SOLUTION INTRAMUSCULAR; INTRAVENOUS; SUBCUTANEOUS EVERY 5 MIN PRN
Status: DISCONTINUED | OUTPATIENT
Start: 2023-04-25 | End: 2023-04-25

## 2023-04-25 RX ORDER — ONDANSETRON 2 MG/ML
INJECTION INTRAMUSCULAR; INTRAVENOUS AS NEEDED
Status: DISCONTINUED | OUTPATIENT
Start: 2023-04-25 | End: 2023-04-25 | Stop reason: SURG

## 2023-04-25 RX ADMIN — EPHEDRINE SULFATE 10 MG: 50 INJECTION INTRAVENOUS at 10:00:00

## 2023-04-25 RX ADMIN — PHENYLEPHRINE HCL 100 MCG: 10 MG/ML VIAL (ML) INJECTION at 10:26:00

## 2023-04-25 RX ADMIN — PHENYLEPHRINE HCL 100 MCG: 10 MG/ML VIAL (ML) INJECTION at 10:14:00

## 2023-04-25 RX ADMIN — SODIUM CHLORIDE, SODIUM LACTATE, POTASSIUM CHLORIDE, CALCIUM CHLORIDE: 600; 310; 30; 20 INJECTION, SOLUTION INTRAVENOUS at 11:04:00

## 2023-04-25 RX ADMIN — ONDANSETRON 4 MG: 2 INJECTION INTRAMUSCULAR; INTRAVENOUS at 10:55:00

## 2023-04-25 RX ADMIN — PHENYLEPHRINE HCL 100 MCG: 10 MG/ML VIAL (ML) INJECTION at 10:09:00

## 2023-04-25 RX ADMIN — SODIUM CHLORIDE, SODIUM LACTATE, POTASSIUM CHLORIDE, CALCIUM CHLORIDE: 600; 310; 30; 20 INJECTION, SOLUTION INTRAVENOUS at 09:50:00

## 2023-04-25 RX ADMIN — PHENYLEPHRINE HCL 100 MCG: 10 MG/ML VIAL (ML) INJECTION at 10:03:00

## 2023-04-25 RX ADMIN — CEFAZOLIN SODIUM/WATER 2 G: 2 G/20 ML SYRINGE (ML) INTRAVENOUS at 10:00:00

## 2023-04-25 RX ADMIN — DEXAMETHASONE SODIUM PHOSPHATE 8 MG: 4 MG/ML VIAL (ML) INJECTION at 10:02:00

## 2023-04-25 RX ADMIN — EPHEDRINE SULFATE 10 MG: 50 INJECTION INTRAVENOUS at 10:20:00

## 2023-04-25 RX ADMIN — DEXAMETHASONE SODIUM PHOSPHATE 8 MG: 4 MG/ML VIAL (ML) INJECTION at 10:03:00

## 2023-04-25 RX ADMIN — EPHEDRINE SULFATE 5 MG: 50 INJECTION INTRAVENOUS at 10:12:00

## 2023-04-25 RX ADMIN — PHENYLEPHRINE HCL 100 MCG: 10 MG/ML VIAL (ML) INJECTION at 10:18:00

## 2023-04-25 RX ADMIN — EPHEDRINE SULFATE 10 MG: 50 INJECTION INTRAVENOUS at 10:32:00

## 2023-04-25 RX ADMIN — LIDOCAINE HYDROCHLORIDE 50 MG: 10 INJECTION, SOLUTION EPIDURAL; INFILTRATION; INTRACAUDAL; PERINEURAL at 09:54:00

## 2023-04-25 NOTE — ANESTHESIA PROCEDURE NOTES
Airway  Date/Time: 4/25/2023 9:55 AM  Urgency: elective    Airway not difficult    General Information and Staff    Patient location during procedure: OR  Anesthesiologist: Barry Cole MD  Resident/CRNA: Alka Ellis CRNA  Performed: CRNA   Performed by: Alka Ellis CRNA  Authorized by: Barry Cole MD      Indications and Patient Condition  Indications for airway management: anesthesia  Sedation level: deep  Preoxygenated: yes  Patient position: sniffing  Mask difficulty assessment: 1 - vent by mask    Final Airway Details  Final airway type: supraglottic airway      Successful airway: classic  Size 4       Number of attempts at approach: 1

## 2023-04-25 NOTE — DISCHARGE INSTRUCTIONS
Plastic Surgery Home Care Instructions      We hope you were pleased with your care at [facility].  We wish you the best outcome and overall experience with your operation. These instructions will help to minimize pain, optimize healing, and improve the likelihood of a successful result. What To Expect  There may be some spotting of the incision lines for the next few days. Mild bruising, mild swelling and discomfort in the surgical area is typical.   Temporary areas of numbness are typical in the early weeks following your procedure. Normalization of sensation can typically take up to several months following the operation. Bandages (Dressing)  Leave yellow gauze in place. Apply antibiotic ointment (Neosporin, bacitracin etc) daily around yellow gauze  Leave dressing on groin in place for 2 days. After 2 days, remove tape and nonstick gauze. Apply antibiotic ointment daily to incision followed by nonstick gauze and paper tape. Bathing/Showers  DO NOT get scalp wet. You can shower from the neck down starting 2 days after surgery. Apply a new dressing of antibiotic ointment, nonstick gauze and paper tape to the groin incision after shower. No baths, swimming, or hot tubs until you receive medical permission    Over-The-Counter Medication  You can take Tylenol after surgery for pain relief as needed  Take as directed on the bottle    Home Medication  Resume your home medications as instructed  Do not resume herbal medications for two weeks  You can restart your blood thinners at your next dose    Diet  Resume your normal diet    Activity  No strenuous activity   You cannot return to physical exercise, sports, or gym workouts until you are allowed to participate in strenuous activity. Driving  Do not drive, if you are taking pain medication. Return to Work or [place] can return to work when you are not taking pain medication, if your work does not involve strenuous activity.   Contact the office, if you need a medical note. Follow-up Appointment with Brigitte Lizarraga PA-C on 5/1/2023 and with Dr. Leigh Ann Koenig on 5/10/2023  Our office will call you to set up a time    Questions or Concerns  Call Dr. Marshall Mcmanus office if you experience severe pain not controlled by pain medication, swelling, numbness, tingling, bleeding, fever, or other concerns. If she is not available, another physician will be able to address your concerns. Kelsey Oliveros   Thank you for coming to [facility] for your operation. The nurses and the anesthesiologist try very hard to make sure you receive the best care possible. Your trust in them is greatly appreciated.     Thanks so much,  Dr. Joby Buchanan PA-C        Carty Shoulder was Given to you at: 12:30pm  This medication contains Tylenol (acetaminophen)  Do not take additional Tylenol while taking Beula Joshua is a Narcotic and can be constipating or upset your stomach  Don't take Norco on an empty stomach  Drink plenty of water  Alcoholic beverages should be avoided while taking narcotics

## 2023-04-26 NOTE — OPERATIVE REPORT
[address]    PATIENT'S NAME: Jenaro WALLACE   ATTENDING PHYSICIAN: Radha Dover MD   OPERATING PHYSICIAN: Radha Dover MD   PATIENT ACCOUNT#:   [id number]    LOCATION:  [address] [place] [address]  MEDICAL RECORD #:   [id number]       YOB: 1945  ADMISSION DATE:       04/25/2023      OPERATION DATE:  04/25/2023    OPERATIVE REPORT      PREOPERATIVE DIAGNOSIS:  Open wound, scalp. POSTOPERATIVE DIAGNOSIS:  Open wound, scalp. PROCEDURE:  Tangential excision of wound bed, preparation of wound bed, scalp 30 sq cm; full-thickness graft from left groin upper thigh to scalp 30 sq cm. ANESTHESIA:  General endotracheal anesthesia. COMPLICATIONS:  None. BLOOD LOSS:  Minimal.    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 Stefania Wei. At that time, partial wound closure as well as Integra placement were performed. He now presents for skin grafting of the defect. He was seen in the office preoperatively and the plan was discussed. Potential risks, complications, benefits and alternatives were discussed. Risks and complications including, but not limited to, infection, bleeding, scarring, damage to surrounding structures, hematoma, seroma, graft failure, alopecia, need for further surgery. The patient understands and wishes to proceed. Questions were answered. OPERATIVE TECHNIQUE:  Patient was identified in the preoperative area. Informed consent was obtained. The site was marked. The patient was then brought back to the operating room, placed in supine position. He was adequately padded. Sequential compression devices were applied. General endotracheal anesthesia was administered. Perioperative antibiotics were given. Then, his left groin and thigh area were prepped and draped in usual sterile fashion as well as his entire scalp.   Then, the procedure was started with tangential excision of the wound bed on the scalp.  Healthy granulation tissue was seen at the wound bed. After adequate preparation of bed with tangential excision, the area for the graft was measured 5 x 6 cm and the area was harvested from the lower groin, upper thigh area. Then, 1% lidocaine with epinephrine was injected around the donor site. Then, a 10 blade was used to harvest the graft and then the graft was defatted and small drainage holes were placed. Graft was then placed to the scalp and secured with 4-0 chronic sutures circumferentially and in the wound base. Then, a Xeroform bolster was applied to the graft and secured with 2-0 silk sutures. The donor site of the skin graft was closed with 3-0 Vicryl sutures for the deep dermal layer, followed by 4-0 Prolene running sutures. Bacitracin and Telfa were applied to that area. The patient tolerated the procedure well and awoke from anesthesia without difficulty. All sponge, instrument, and needle counts were correct at the end of the case. Dictated By Mariluz Dc.  Klaus Childs MD  d: 04/25/2023 18:03:29  t: 04/26/2023 03:43:25  Job [id number]  \[facility]\""/

## 2023-05-01 ENCOUNTER — OFFICE VISIT (OUTPATIENT)
Dept: SURGERY | Facility: CLINIC | Age: 78
End: 2023-05-01
Payer: MEDICARE

## 2023-05-01 DIAGNOSIS — C80.1 MALIGNANT SPINDLE CELL NEOPLASM (HCC): Primary | ICD-10-CM

## 2023-05-10 ENCOUNTER — OFFICE VISIT (OUTPATIENT)
Dept: SURGERY | Facility: CLINIC | Age: 78
End: 2023-05-10
Payer: MEDICARE

## 2023-05-10 DIAGNOSIS — C80.1 MALIGNANT SPINDLE CELL NEOPLASM (HCC): Primary | ICD-10-CM

## 2023-05-10 PROCEDURE — 99024 POSTOP FOLLOW-UP VISIT: CPT | Performed by: SURGERY

## 2023-05-10 PROCEDURE — 1160F RVW MEDS BY RX/DR IN RCRD: CPT | Performed by: SURGERY

## 2023-05-10 PROCEDURE — 1159F MED LIST DOCD IN RCRD: CPT | Performed by: SURGERY

## 2023-08-16 NOTE — BRIEF OP NOTE
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 HPI    Recently smoking more than the usual and last few days he has more cough and wheezes but no fever or chills  Dyspnea upon exertion with acceptable baseline  No chest pain orthopnea PND  No abdominal pain or nausea or vomiting or diarrhea  No lower extremity edema or pain or calf tenderness  History/Other:   Review of Systems   Constitutional:  Negative for fever and unexpected weight change. HENT:  Negative for congestion. Respiratory:  Positive for cough and wheezing. Cardiovascular: Negative. Genitourinary: Negative. Musculoskeletal: Negative. Skin: Negative. Neurological: Negative. Hematological: Negative. Psychiatric/Behavioral: Negative. Current Outpatient Medications   Medication Sig Dispense Refill    albuterol (VENTOLIN HFA) 108 (90 Base) MCG/ACT Inhalation Aero Soln Inhale 2 puffs into the lungs every 6 (six) hours as needed for Wheezing. 54 each 5    Olmesartan Medoxomil 40 MG Oral Tab Take 1 tablet (40 mg total) by mouth daily. For blood pressure 90 tablet 1    buPROPion  MG Oral Tablet 12 Hr Take 1 tablet (150 mg total) by mouth 2 (two) times daily. For 7 days just take it in the morning and then after that he will take the second dose around 3 PM daily and do twice daily. (Patient taking differently: Take 1 tablet (150 mg total) by mouth 2 (two) times daily. For 7 days just take it in the morning and then after that he will take the second dose around 3 PM daily and do twice daily. ) 180 tablet 0    fluticasone-salmeterol (Gilmar Freddy) 250-50 MCG/ACT Inhalation Aerosol Powder, Breath Activated Inhale 1 puff into the lungs every 12 (twelve) hours. 3 each 3    atorvastatin 10 MG Oral Tab Take 1 tablet (10 mg total) by mouth daily. 90 tablet 2    Fenofibrate 160 MG Oral Tab Take 1 tablet (160 mg total) by mouth daily.  90 tablet 2    levothyroxine 75 MCG Oral Tab Take 1 tablet (75 mcg total) by mouth before breakfast. Due for follow up 90 tablet 2    Omeprazole 40 MG Oral Capsule Delayed Release Take 1 capsule (40 mg total) by mouth daily. 90 capsule 2    fluticasone propionate 50 MCG/ACT Nasal Suspension 2 sprays by Nasal route daily. 48 mL 2    Multiple Vitamins-Minerals (CENTRUM SILVER 50+MEN OR) Take by mouth. Dutasteride 0.5 MG Oral Cap        Allergies:No Known Allergies    Objective:   Physical Exam  Constitutional:       General: He is not in acute distress. Appearance: He is ill-appearing. HENT:      Head: Normocephalic and atraumatic. Nose: Nose normal.      Mouth/Throat:      Mouth: Mucous membranes are moist.   Cardiovascular:      Rate and Rhythm: Normal rate. Heart sounds: No murmur heard. No gallop. Pulmonary:      Effort: Pulmonary effort is normal.      Comments: Diminished breath sounds bilaterally  Few expiratory wheezes  No rales or rhonchi  Abdominal:      General: Abdomen is flat. Bowel sounds are normal.      Tenderness: There is no guarding or rebound. Musculoskeletal:      Cervical back: No rigidity. Lymphadenopathy:      Cervical: No cervical adenopathy. Skin:     Findings: No lesion or rash. Neurological:      General: No focal deficit present. Mental Status: He is oriented to person, place, and time. Mental status is at baseline. Chest ct 6/13/2023 reviewed   FINDINGS:  CARDIAC: Heart is not enlarged. There are coronary artery calcifications. No pericardial effusion. MEDIASTINUM/MEG: No mediastinal or hilar adenopathy. LUNGS/PLEURA: Central airways are patent with probable retained mucus secretions within the trachea bilateral perihilar bronchial wall thickening. Upper lobe predominant panlobular emphysema. Scattered paraseptal thickening. 12 x 13 mm nodular solid  focus in the right upper lobe posterior segment (series 3, image 27). Hairline spiculations along its outer margins.   It previously measured 13 x 17 mm 2/8/23, was not FDG avid on 2/17/23 PET-CT, and was 12 x 15 mm on 5/22/23 CT chest.  No new pulmonary   nodules  VASCULATURE: Main pulmonary artery is not enlarged. Left-sided aortic arch with atherosclerosis without aneurysm. CHEST WALL: No axillary or clavicular lymphadenopathy. LIMITED ABDOMEN: Stable 7 mm low-density focus in the right hepatic lobe, most with cyst however it is too small to definitively characterize. BONES: Scattered mild degenerative change spine. Impression   CONCLUSION:  1.  12 x 13 mm solid pulmonary nodule in the right upper lobe, not FDG avid on 2/17/23 PET-CT and slightly smaller since 2/8/23 CT chest.  Current and prior imaging findings collectively favor a benign process possibly representing secretions within an  airway, infection, or resolving pneumonitis - however continued CT surveillance is recommended. Recommend next follow-up CT chest (without IV contrast) in 3 months. 2.  Coronary atherosclerosis. 3.  Pulmonary emphysema. 4.  Stable subcentimeter low-density focus in the right hepatic lobe likely representing a cyst however it is too small to definitively characterize. Assessment & Plan:   Lung nodule  (primary encounter diagnosis)  Chronic obstructive pulmonary disease, unspecified COPD type ([address]      1- lung nodule      Seem on chest ct 2/8/2023  ( 1.7 x 1.4 cm RUL )     PET scan 2/17/2023 negative       smaller onn last chest ct 6/14/2023 around 12 mm      The decrease in size of the nodule and  previously negative PET scan more suggestive for benign nodule .     F/u chest ct in 4- 6 months interval         2-severe COPD with acute exacerbation   lifelong history of smoking / continue to smoke   FEV1 44 % 1.19 L     Augmentin and taper course of prednisone  Complete smoking cessation and extensive counseling provided  Continue with ICS/LABA with Wixela  Albuterol as needed        F/u in 3  months                   Meds This Visit:  Requested Prescriptions      No prescriptions requested or ordered in this encounter       Imaging & Referrals:  None

## (undated) DEVICE — LIGHT HANDLE

## (undated) DEVICE — SOL NACL IRRIG 0.9% 1000ML BTL

## (undated) DEVICE — SLEEVE KENDALL SCD EXPRESS MED

## (undated) DEVICE — #15 STERILE STAINLESS BLADE: Brand: STERILE STAINLESS BLADES

## (undated) DEVICE — ELECTRODE ESURG 2.75IN EZ CLN

## (undated) DEVICE — SUT SILK 2-0 SH K833H

## (undated) DEVICE — GEL AQUASONIC 100 20GR

## (undated) DEVICE — OCCLUSIVE GAUZE STRIP OVERWRAP,3% BISMUTH TRIBROMOPHENATE IN PETROLATUM BLEND: Brand: XEROFORM

## (undated) DEVICE — SUT PROLENE 4-0 RB-1 8557H

## (undated) DEVICE — NON-ADHERENT PAD PREPACK: Brand: TELFA

## (undated) DEVICE — SUT PROLENE 5-0 RB-2 8710H

## (undated) DEVICE — SUT PROLENE 5-0 P-3 8698G

## (undated) DEVICE — MEDI-VAC SUCTION HANDLE REGULAR CAPACITY: Brand: CARDINAL HEALTH

## (undated) DEVICE — CABLE BIPOLAR 12FT DISPOSABLE

## (undated) DEVICE — SUT VICRYL 5-0 RB-1 J213H

## (undated) DEVICE — HEAD AND NECK CDS-LF: Brand: MEDLINE INDUSTRIES, INC.

## (undated) DEVICE — DRESSING TUBE GAUZE SZ 10

## (undated) DEVICE — STERILE SYNTHETIC POLYISOPRENE POWDER-FREE SURGICAL GLOVES WITH HYDROGEL COATING, SMOOTH FINISH, STRAIGHT FINGER: Brand: PROTEXIS

## (undated) DEVICE — SUT VICRYL 3-0 SH J416H

## (undated) DEVICE — MEGADYNE ELECTRODE ADULT PT RT

## (undated) DEVICE — MEGADYNE E-Z CLEAN BLADE 2.75"

## (undated) DEVICE — PREP BETADINE SOL 5% EYE

## (undated) DEVICE — SUT SILK 3-0 SH K832H

## (undated) DEVICE — SUT PROLENE 2-0 SH 8833H

## (undated) DEVICE — HEMOCLIP HORIZON MED 002200

## (undated) DEVICE — 4.8MM ROUND FAST CUTTING BUR

## (undated) DEVICE — PROXIMATE SKIN STAPLERS (35 WIDE) CONTAINS 35 STAINLESS STEEL STAPLES (FIXED HEAD): Brand: PROXIMATE

## (undated) DEVICE — SUT CHROMIC GUT 5-0 RB-1 U202H

## (undated) DEVICE — 3M™ STERI-STRIP™ REINFORCED ADHESIVE SKIN CLOSURES, R1547, 1/2 IN X 4 IN (12 MM X 100 MM), 6 STRIPS/ENVELOPE: Brand: 3M™ STERI-STRIP™

## (undated) DEVICE — STERILE POLYISOPRENE POWDER-FREE SURGICAL GLOVES: Brand: PROTEXIS

## (undated) DEVICE — SYRINGE 10ML LL TIP

## (undated) DEVICE — TOWEL SURG OR 17X30IN BLUE

## (undated) DEVICE — ELECTRODE EDGE PENCIL 10FT

## (undated) DEVICE — #10 STERILE DISPOSABLE SCALPEL: Brand: DISPOSABLE SCALPEL

## (undated) DEVICE — SPECIMAN CONTAINER 4OZ STERILE

## (undated) DEVICE — STANDARD HYPODERMIC NEEDLE,POLYPROPYLENE HUB: Brand: MONOJECT

## (undated) DEVICE — Device

## (undated) DEVICE — PROXIMATE RH ROTATING HEAD SKIN STAPLERS (35 WIDE) CONTAINS 35 STAINLESS STEEL STAPLES: Brand: PROXIMATE

## (undated) DEVICE — PEN SKIN MARKING REG TIP VIOLT

## (undated) DEVICE — LAPAROTOMY SPONGE - RF AND X-RAY DETECTABLE PRE-WASHED: Brand: SITUATE

## (undated) DEVICE — SHEET, DRAPE, SPLIT, STERILE: Brand: MEDLINE

## (undated) DEVICE — PROVE COVER: Brand: UNBRANDED

## (undated) DEVICE — PREMIUM WET SKIN PREP TRAY: Brand: MEDLINE INDUSTRIES, INC.

## (undated) DEVICE — SUT CHROMIC GUT 4-0 RB-1 U203H

## (undated) DEVICE — BLADE 24 SS SRG STRL

## (undated) DEVICE — SUT ETHIBOND 2-0 SH X833H

## (undated) DEVICE — CURAD NONADHERANT PAD 3X8

## (undated) DEVICE — SHEET,DRAPE,40X58,STERILE: Brand: MEDLINE

## (undated) DEVICE — #10 STERILE BLADE: Brand: POLYMER COATED BLADES

## (undated) NOTE — LETTER
[facility] Cardiac Device Communication Tool    Preop to complete    Patient Name Nico Moscoso   Patient  - AGE - SEX 10/9/1945 - A: 76 y - male   Surgical Date 2023   Surgical Procedure Scalp reconstruction with skin graft   Surgical Location [facility]   Type of cautery anticipated: Monopolar         Device Clinic to Complete the Information Below, Sign and Fax to [phone]    Pacemaker or ICD    Atrial or atrial-ventricular lead?     Indication for device    Is patient pacemaker dependent? Has pt had routine f/u and is battery life > 3 months    Is ICD programmed to inhibit therapy w/magnet? Does device have rate response or other sensor?       Surgical Procedure above [facility] Surgical Procedure @ [facility] and below   < 6 in. from ICD: Reprogram therapies OFF w/  asynchronous pacing if PM dependent  < 6 in. from PM: Reprogram asynchronous if PM   dependent ICD: No Change  PM: No Change   > 6 in. from ICD: Magnet*  > 6 in. from PM:  No Change*  * If PM dependent observe for pacing inhibition and minimize cautery if inhibition is seen                                              Bipolar cautery: No Change   Cardiac Device Management Plan (check one)            ___  Reprogram (PAT Dept to provide Rep w/ arrival date/time)   ___ Magnet    ___ No Change    Comments: ___________________________________________________________________        Signature: ________________________________ Date: ____________ Time: ______________     Print Name: ___________________________________

## (undated) NOTE — LETTER
[facility] Cardiac Device Communication Tool    Preop to complete    [address] Phone: [phone] Fax: [phone]   Patient Name Nico Moscoso   Patient  - AGE - SEX 10/9/1945 - A: 76 y - male   Surgical Date 2023   Surgical Procedure Scalp reconstruction with skin graft   Surgical Location [facility]   Type of cautery anticipated: Monopolar         Device Clinic to Complete the Information Below, Sign and Fax to [phone]    Pacemaker or ICD    Atrial or atrial-ventricular lead?     Indication for device    Is patient pacemaker dependent? Has pt had routine f/u and is battery life > 3 months    Is ICD programmed to inhibit therapy w/magnet? Does device have rate response or other sensor?       Surgical Procedure above [facility] Surgical Procedure @ [facility] and below   < 6 in. from ICD: Reprogram therapies OFF w/  asynchronous pacing if PM dependent  < 6 in. from PM: Reprogram asynchronous if PM   dependent ICD: No Change  PM: No Change   > 6 in. from ICD: Magnet*  > 6 in. from PM:  No Change*  * If PM dependent observe for pacing inhibition and minimize cautery if inhibition is seen                                              Bipolar cautery: No Change   Cardiac Device Management Plan (check one)            ___  Reprogram (PAT Dept to provide Rep w/ arrival date/time)   ___ Magnet    ___ No Change    Comments: ___________________________________________________________________        Signature: ________________________________ Date: ____________ Time: ______________     Print Name: ___________________________________

## (undated) NOTE — LETTER
[facility] Cardiac Device Communication Tool    Preop to complete    [facility] Cariology Device Clinic Phone: [phone] Fax: [phone]   Patient Name Nico Moscoso   Patient  - AGE - SEX 10/9/1945 - A: 76 y - male   Surgical Date 3/21/2023   Surgical Procedure Radical resection scalp to include bone resection (SALTI) Scalp reconstruction with possible integra, possible skin graft, possible local tissue rearrangement (MONTY)   Surgical Location [facility]   Type of cautery anticipated: Monopolar   Surgical procedure > 2 hours      Device Clinic to Complete the Information Below, Sign and Fax to [phone]    Pacemaker or ICD    Atrial or atrial-ventricular lead?     Indication for device    Is patient pacemaker dependent? Has pt had routine f/u and is battery life > 3 months    Is ICD programmed to inhibit therapy w/magnet? Does device have rate response or other sensor?       Surgical Procedure above [facility] Surgical Procedure @ [place] and below   < 6 in. from ICD: Reprogram therapies OFF w/  asynchronous pacing if PM dependent  < 6 in. from PM: Reprogram asynchronous if PM   dependent ICD: No Change  PM: No Change   > 6 in. from ICD: Magnet*  > 6 in. from PM:  No Change*  * If PM dependent observe for pacing inhibition and minimize cautery if inhibition is seen                                              Bipolar cautery: No Change   Cardiac Device Management Plan (check one)            ___  Reprogram (PAT Dept to provide Rep w/ arrival date/time)   ___ Magnet    ___ No Change    Comments: ___________________________________________________________________        Signature: ________________________________ Date: ____________ Time: ______________     Print Name: ___________________________________

## (undated) NOTE — LETTER
[facility] Cardiac Device Communication Tool    Preop to complete    [facility] Cariology Device Clinic Phone: [phone] Fax: [phone]   Patient Name Nico Moscoso   Patient  - AGE - SEX 10/9/1945 - A: 76 y - male   Surgical Date 3/21/2023   Surgical Procedure Radical resection scalp to include bone resection (SALTI) Scalp reconstruction with possible integra, possible skin graft, possible local tissue rearrangement (MONTY)   Surgical Location [facility]   Type of cautery anticipated: Monopolar   Surgical procedure > 2 hours      Device Clinic to Complete the Information Below, Sign and Fax to [phone]    Pacemaker or ICD    Atrial or atrial-ventricular lead?     Indication for device    Is patient pacemaker dependent? Has pt had routine f/u and is battery life > 3 months    Is ICD programmed to inhibit therapy w/magnet? Does device have rate response or other sensor?       Surgical Procedure above [facility] Surgical Procedure @ [place] and below   < 6 in. from ICD: Reprogram therapies OFF w/  asynchronous pacing if PM dependent  < 6 in. from PM: Reprogram asynchronous if PM   dependent ICD: No Change  PM: No Change   > 6 in. from ICD: Magnet*  > 6 in. from PM:  No Change*  * If PM dependent observe for pacing inhibition and minimize cautery if inhibition is seen                                              Bipolar cautery: No Change   Cardiac Device Management Plan (check one)            ___  Reprogram (PAT Dept to provide Rep w/ arrival date/time)   ___ Magnet    ___ No Change    Comments: ___________________________________________________________________        Signature: ________________________________ Date: ____________ Time: ______________     Print Name: ___________________________________

## (undated) NOTE — LETTER
[facility] Cardiac Device Communication Tool    Preop to complete    Patient Name Nico Moscoso   Patient  - AGE - SEX 10/9/1945 - A: 76 y - male   Surgical Date 3/21/2023   Surgical Procedure Radical resection scalp to include bone resection (BERTO) Scalp reconstruction with possible integra, possible skin graft, possible local tissue rearrangement (MONTY)   Surgical Location [facility]   Type of cautery anticipated: Monopolar   Prone Position      Device Clinic to Complete the Information Below, Sign and Fax to [phone]    Pacemaker or ICD    Atrial or atrial-ventricular lead?     Indication for device    Is patient pacemaker dependent? Has pt had routine f/u and is battery life > 3 months    Is ICD programmed to inhibit therapy w/magnet? Does device have rate response or other sensor?       Surgical Procedure above [facility] Surgical Procedure @ [place] [facility] and below   < 6 in. from ICD: Reprogram therapies OFF w/  asynchronous pacing if PM dependent  < 6 in. from PM: Reprogram asynchronous if PM   dependent ICD: No Change  PM: No Change   > 6 in. from ICD: Magnet*  > 6 in. from PM:  No Change*  * If PM dependent observe for pacing inhibition and minimize cautery if inhibition is seen                                              Bipolar cautery: No Change   Cardiac Device Management Plan (check one)            ___  Reprogram (PAT Dept to provide Rep w/ arrival date/time)   ___ Magnet    ___ No Change    Comments: ___________________________________________________________________        Signature: ________________________________ Date: ____________ Time: ______________     Print Name: ___________________________________

## (undated) NOTE — LETTER
Patient Name: Maximo Mcmillan / Sex: 10/9/1945-A: 76 y  male   Medical Records: [id number] CSN: [id number]    ABNORMAL VALUES  Surgeon(s):  Emma Trujillo MD  Anesthesia Type: General  Date of Surgery: 4/11/2023  Procedure Description: Scalp reconstruction with skin graft  Primary Surgeon:  Emma Trujillo MD  Phone Number: [phone]    PLEASE NOTE THE FOLLOWING ABNORMALITIES:   {EDW PAT FAX ABNORMALITIES:[id number]}  ________________________________________________________  {EDW OR FAX ANESTHESIA:[id number]}